# Patient Record
Sex: FEMALE | Race: WHITE | NOT HISPANIC OR LATINO | URBAN - METROPOLITAN AREA
[De-identification: names, ages, dates, MRNs, and addresses within clinical notes are randomized per-mention and may not be internally consistent; named-entity substitution may affect disease eponyms.]

---

## 2020-04-16 ENCOUNTER — HISTORICAL (OUTPATIENT)
Dept: ADMINISTRATIVE | Facility: HOSPITAL | Age: 45
End: 2020-04-16

## 2020-04-16 LAB
ABS NEUT (OLG): 3.15 X10(3)/MCL (ref 2.1–9.2)
BASOPHILS # BLD AUTO: 0 X10(3)/MCL (ref 0–0.2)
BASOPHILS NFR BLD AUTO: 1 %
BUN SERPL-MCNC: 31 MG/DL (ref 7–18.7)
CALCIUM SERPL-MCNC: 8.8 MG/DL (ref 8.4–10.2)
CHLORIDE SERPL-SCNC: 95 MMOL/L (ref 98–107)
CO2 SERPL-SCNC: 27 MMOL/L (ref 22–29)
CREAT SERPL-MCNC: 5.23 MG/DL (ref 0.55–1.02)
CREAT/UREA NIT SERPL: 6
EOSINOPHIL # BLD AUTO: 0.1 X10(3)/MCL (ref 0–0.9)
EOSINOPHIL NFR BLD AUTO: 2 %
ERYTHROCYTE [DISTWIDTH] IN BLOOD BY AUTOMATED COUNT: 15.5 % (ref 11.5–17)
GLUCOSE SERPL-MCNC: 194 MG/DL (ref 74–100)
HCT VFR BLD AUTO: 44.7 % (ref 37–47)
HGB BLD-MCNC: 13.5 GM/DL (ref 12–16)
LYMPHOCYTES # BLD AUTO: 1 X10(3)/MCL (ref 0.6–4.6)
LYMPHOCYTES NFR BLD AUTO: 21 %
MCH RBC QN AUTO: 31.6 PG (ref 27–31)
MCHC RBC AUTO-ENTMCNC: 30.2 GM/DL (ref 33–36)
MCV RBC AUTO: 104.7 FL (ref 80–94)
MONOCYTES # BLD AUTO: 0.4 X10(3)/MCL (ref 0.1–1.3)
MONOCYTES NFR BLD AUTO: 9 %
NEUTROPHILS # BLD AUTO: 3.15 X10(3)/MCL (ref 2.1–9.2)
NEUTROPHILS NFR BLD AUTO: 66 %
PLATELET # BLD AUTO: 149 X10(3)/MCL (ref 130–400)
PMV BLD AUTO: 12.1 FL (ref 9.4–12.4)
POTASSIUM SERPL-SCNC: 4.6 MMOL/L (ref 3.5–5.1)
RBC # BLD AUTO: 4.27 X10(6)/MCL (ref 4.2–5.4)
SODIUM SERPL-SCNC: 137 MMOL/L (ref 136–145)
WBC # SPEC AUTO: 4.8 X10(3)/MCL (ref 4.5–11.5)

## 2020-06-23 ENCOUNTER — HISTORICAL (OUTPATIENT)
Dept: ADMINISTRATIVE | Facility: HOSPITAL | Age: 45
End: 2020-06-23

## 2020-06-28 LAB
FINAL CULTURE: NORMAL
FINAL CULTURE: NORMAL

## 2020-07-02 LAB
BUN SERPL-MCNC: 17.6 MG/DL (ref 7–18.7)
CALCIUM SERPL-MCNC: 9 MG/DL (ref 8.4–10.2)
CHLORIDE SERPL-SCNC: 98 MMOL/L (ref 98–107)
CO2 SERPL-SCNC: 28 MMOL/L (ref 22–29)
CREAT SERPL-MCNC: 4.33 MG/DL (ref 0.55–1.02)
CREAT/UREA NIT SERPL: 4
ERYTHROCYTE [DISTWIDTH] IN BLOOD BY AUTOMATED COUNT: 16.5 % (ref 11.5–17)
GLUCOSE SERPL-MCNC: 192 MG/DL (ref 74–100)
HCT VFR BLD AUTO: 32.7 % (ref 37–47)
HGB BLD-MCNC: 9.8 GM/DL (ref 12–16)
MCH RBC QN AUTO: 31 PG (ref 27–31)
MCHC RBC AUTO-ENTMCNC: 30 GM/DL (ref 33–36)
MCV RBC AUTO: 103.5 FL (ref 80–94)
PLATELET # BLD AUTO: 192 X10(3)/MCL (ref 130–400)
PMV BLD AUTO: 11.3 FL (ref 9.4–12.4)
POTASSIUM SERPL-SCNC: 4.1 MMOL/L (ref 3.5–5.1)
RBC # BLD AUTO: 3.16 X10(6)/MCL (ref 4.2–5.4)
SODIUM SERPL-SCNC: 139 MMOL/L (ref 136–145)
WBC # SPEC AUTO: 5.5 X10(3)/MCL (ref 4.5–11.5)

## 2020-07-06 ENCOUNTER — HISTORICAL (OUTPATIENT)
Dept: ADMINISTRATIVE | Facility: HOSPITAL | Age: 45
End: 2020-07-06

## 2020-07-06 LAB
BUN SERPL-MCNC: 55.7 MG/DL (ref 7–18.7)
CALCIUM SERPL-MCNC: 8.3 MG/DL (ref 8.4–10.2)
CHLORIDE SERPL-SCNC: 93 MMOL/L (ref 98–107)
CO2 SERPL-SCNC: 21 MMOL/L (ref 22–29)
CREAT SERPL-MCNC: 9.32 MG/DL (ref 0.55–1.02)
CREAT/UREA NIT SERPL: 6
GLUCOSE SERPL-MCNC: 130 MG/DL (ref 74–100)
POTASSIUM SERPL-SCNC: 5.3 MMOL/L (ref 3.5–5.1)
SODIUM SERPL-SCNC: 129 MMOL/L (ref 136–145)

## 2022-04-30 NOTE — OP NOTE
Patient:   Prasanna Pena            MRN: [id number]            FIN: [id number]               Age:   45 years     Sex:  Female     :  1975   Associated Diagnoses:   ESRD on dialysis   Author:   Priscilla Xiao MD      Brief Operative Note   Operative Information   Date/ Time:  2020 14:50:00.     Preoperative Diagnosis: ESRD on dialysis ([id number] N18.6).     Postoperative Diagnosis: ESRD on dialysis ([id number] N18.6).     Procedures Performed: Right upper extremity dialysis graft placement.     Surgeon: Priscilla Xiao MD.     Assistant: Sudha Dove.     Esimated blood loss: loss less than  100  cc.     Description of Procedure/Findings/    Complications: The patient was taken to the OR and placed in a supine position on the operative table.  The left upper extremity was prepped and draped in the usual sterile fashion.   2 grams ancef infusion started prior to the incision.  Appropriate timeout was performed.  Ultrasound was initially used to find the upper arm cephalic and brachial artery.  The vein appeared very small through its upper arm course and the artery appeared to be calcified.  I felt that brachiocephalic fistula creation would be high risk for failure.  Decision was made to place graft. B mode u/s was utilized to identify the brachial artery and axillary vein in the proximal upper arm.  Incision was made and dissection was performed through the subcutaneous tissues and facia to expose the brachial artery and axillary vein.  Each were controlled with vessel loops.  A 4 to 7 mm goretex graft was placed via subcutaneous tunnelling in loop configuration with assistance of a counterincision in the distal upper arm.  5000 units of heparin was administered and a longitudinal incision was made on the brachial artery.   Anastomosis was performed with a [place] CV6 suture.  Flow was restored through the brachial artery.  The distal end of the graft was spatulated and  venotomy was made.   Another anastamosis with [place] CV-6  suture was performed.  Flow was restored to the system.  There was thrill to the graft and a lightly palpable radial pulse - monophasic signals to radial and ulnar artery at wrist.  Palmar arch signal present as well..    Protamine was administered and hemostasis was obtained.  Wound was irrigated with antibiotic solution.   Both wounds were closed with 3-0 vicryl suture and 4-0 monocryl suture.   Dermabond dressing was placed.         Sudha Gaspar expertly assisted throughout the case.  She assisted with vessel exposure and anastamosis.  She performed wound closure. .